# Patient Record
Sex: FEMALE | Race: BLACK OR AFRICAN AMERICAN | Employment: UNEMPLOYED | URBAN - METROPOLITAN AREA
[De-identification: names, ages, dates, MRNs, and addresses within clinical notes are randomized per-mention and may not be internally consistent; named-entity substitution may affect disease eponyms.]

---

## 2019-01-25 PROBLEM — E66.01 OBESITY, MORBID (HCC): Status: ACTIVE | Noted: 2019-01-25

## 2021-11-03 LAB — SARS-COV-2, NAA: NEGATIVE

## 2022-03-20 PROBLEM — E66.01 OBESITY, MORBID (HCC): Status: ACTIVE | Noted: 2019-01-25

## 2022-11-03 ENCOUNTER — OFFICE VISIT (OUTPATIENT)
Dept: FAMILY MEDICINE CLINIC | Age: 32
End: 2022-11-03

## 2022-11-03 VITALS
HEART RATE: 71 BPM | DIASTOLIC BLOOD PRESSURE: 82 MMHG | HEIGHT: 66 IN | WEIGHT: 293 LBS | TEMPERATURE: 98.1 F | RESPIRATION RATE: 16 BRPM | SYSTOLIC BLOOD PRESSURE: 136 MMHG | OXYGEN SATURATION: 98 % | BODY MASS INDEX: 47.09 KG/M2

## 2022-11-03 DIAGNOSIS — J45.909 UNCOMPLICATED ASTHMA, UNSPECIFIED ASTHMA SEVERITY, UNSPECIFIED WHETHER PERSISTENT: ICD-10-CM

## 2022-11-03 DIAGNOSIS — R03.0 PREHYPERTENSION: Primary | ICD-10-CM

## 2022-11-03 PROCEDURE — 99213 OFFICE O/P EST LOW 20 MIN: CPT | Performed by: NURSE PRACTITIONER

## 2022-11-03 RX ORDER — PHENOL/SODIUM PHENOLATE
20 AEROSOL, SPRAY (ML) MUCOUS MEMBRANE DAILY
COMMUNITY
End: 2022-11-03 | Stop reason: ALTCHOICE

## 2022-11-03 RX ORDER — ALBUTEROL SULFATE 0.83 MG/ML
2.5 SOLUTION RESPIRATORY (INHALATION)
Qty: 24 EACH | Refills: 1 | Status: SHIPPED | OUTPATIENT
Start: 2022-11-03

## 2022-11-03 RX ORDER — ALBUTEROL SULFATE 90 UG/1
2 AEROSOL, METERED RESPIRATORY (INHALATION)
Qty: 3 EACH | Refills: 1 | Status: SHIPPED | OUTPATIENT
Start: 2022-11-03

## 2022-11-03 RX ORDER — MONTELUKAST SODIUM 10 MG/1
10 TABLET ORAL DAILY
Qty: 90 TABLET | Refills: 1 | Status: SHIPPED | OUTPATIENT
Start: 2022-11-03

## 2022-11-03 NOTE — PROGRESS NOTES
Chief Complaint   Patient presents with    Medication Evaluation       1. \"Have you been to the ER, urgent care clinic since your last visit? Hospitalized since your last visit? \" No    2. \"Have you seen or consulted any other health care providers outside of the [address] [place] [address] since your last visit? \" No     3. For patients over 45: Has the patient had a colonoscopy? NA - based on age     If the patient is female:    4. For patients over 36: Has the patient had a mammogram? NA - based on age    11. For patients over 21: Has the patient had a pap smear?  Yes - no Care Gap present    3 most recent PHQ Screens 11/3/2022   Little interest or pleasure in doing things Not at all   Feeling down, depressed, irritable, or hopeless Not at all   Total Score PHQ 2 0     Health Maintenance Due   Topic Date Due    COVID-19 Vaccine (1) Never done    DTaP/Tdap/Td series (1 - Tdap) Never done    Flu Vaccine (1) Never done

## 2022-11-03 NOTE — PATIENT INSTRUCTIONS
Zyrtec, Singulair, Pepcid - all one a day  Albuterol as needed    See pulmonology for pulmonary function testing  [facility]    H Pylori and pap smear after January.

## 2022-11-03 NOTE — PROGRESS NOTES
Assessment/Plan:     Diagnoses and all orders for this visit:    1. Prehypertension  We have discussed lifestyle modifications to improve blood pressure. She will follow up with [facility] regarding ALEX evaluation. 2. Uncomplicated asthma, unspecified asthma severity, unspecified whether persistent  -     montelukast (Singulair) 10 mg tablet; Take 1 Tablet by mouth daily. -     albuterol (PROVENTIL HFA, VENTOLIN HFA, PROAIR HFA) 90 mcg/actuation inhaler; Take 2 Puffs by inhalation every four (4) hours as needed for Wheezing.  -     albuterol (PROVENTIL VENTOLIN) 2.5 mg /3 mL (0.083 %) nebu; 3 mL by Nebulization route every four (4) hours as needed for Wheezing. Treatment as above. Follow up if no improvement. Consider PFT if persistent. Follow-up and Dispositions    Return in about 3 months (around 2/3/2023) for Complete Physical.         Discussed expected course/resolution/complications of diagnosis in detail with patient. Medication risks/benefits/costs/interactions/alternatives discussed with patient. Pt was given after visit summary which includes diagnoses, current medications & vitals. Pt expressed understanding with the diagnosis and plan          Subjective:      Matheus Mills is a 34 y.o. female who presents for had concerns including Medication Evaluation. Here for follow up of asthma. History of asthma is longstanding. Symptoms are worsening over time. Has attempted albuterol more regularly with no improvement. This is her first evaluation. Not currently attempting otc treatment. Patient Active Problem List   Diagnosis Code    [facility] V33.80    Uncomplicated asthma H55.791    Prehypertension R03.0       Current Outpatient Medications   Medication Sig Dispense Refill    montelukast (Singulair) 10 mg tablet Take 1 Tablet by mouth daily.  90 Tablet 1    albuterol (PROVENTIL HFA, VENTOLIN HFA, PROAIR HFA) 90 mcg/actuation inhaler Take 2 Puffs by inhalation every four (4) hours as needed for Wheezing. 3 Each 1    albuterol (PROVENTIL VENTOLIN) 2.5 mg /3 mL (0.083 %) nebu 3 mL by Nebulization route every four (4) hours as needed for Wheezing. 24 Each 1    levonorgestrel (LILETTA IU) by IntraUTERine route. Nebulizer & Compressor machine Use as directed q4h prn 1 Each 0       Allergies   Allergen Reactions    Bactrim [Sulfamethoxazole-Trimethoprim] Itching       ROS:   Review of Systems   Constitutional:  Negative for malaise/fatigue. Eyes:  Negative for blurred vision. Respiratory:  Positive for wheezing. Negative for shortness of breath. Cardiovascular:  Negative for chest pain. Objective:   Visit Vitals  /82 (BP 1 Location: Left upper arm, BP Patient Position: Sitting, BP Cuff Size: Large adult long)   Pulse 71   Temp 98.1 °F (36.7 °C)   Resp 16   Ht 5' 6\" (1.676 m)   Wt (!) 353 lb (160.1 kg)   LMP  (LMP Unknown)   SpO2 98%   BMI 56.98 kg/m²       Vitals and Nurse Documentation reviewed. Physical Exam  Constitutional:       General: She is not in acute distress. Appearance: She is obese. Cardiovascular:      Heart sounds: S1 normal and S2 normal. No murmur heard. No friction rub. No gallop. Pulmonary:      Effort: No respiratory distress. Breath sounds: No wheezing. Skin:     General: Skin is warm and dry.    Psychiatric:         Mood and Affect: Mood and affect normal.

## 2022-11-16 PROBLEM — J45.909 UNCOMPLICATED ASTHMA: Status: ACTIVE | Noted: 2022-11-16

## 2022-11-16 PROBLEM — R03.0 PREHYPERTENSION: Status: ACTIVE | Noted: 2022-11-16

## 2025-05-09 DIAGNOSIS — Z34.90 PREGNANCY, UNSPECIFIED GESTATIONAL AGE: Primary | ICD-10-CM

## 2025-05-12 ENCOUNTER — ROUTINE PRENATAL (OUTPATIENT)
Age: 35
End: 2025-05-12

## 2025-05-12 VITALS — SYSTOLIC BLOOD PRESSURE: 126 MMHG | DIASTOLIC BLOOD PRESSURE: 74 MMHG | HEART RATE: 82 BPM

## 2025-05-12 DIAGNOSIS — Z34.90 PREGNANCY, UNSPECIFIED GESTATIONAL AGE: ICD-10-CM

## 2025-05-12 PROCEDURE — 99204 OFFICE O/P NEW MOD 45 MIN: CPT | Performed by: STUDENT IN AN ORGANIZED HEALTH CARE EDUCATION/TRAINING PROGRAM

## 2025-05-12 PROCEDURE — 76811 OB US DETAILED SNGL FETUS: CPT | Performed by: STUDENT IN AN ORGANIZED HEALTH CARE EDUCATION/TRAINING PROGRAM

## 2025-05-12 RX ORDER — ACETAMINOPHEN 160 MG
2000 TABLET,DISINTEGRATING ORAL DAILY
COMMUNITY

## 2025-05-12 RX ORDER — ASPIRIN 81 MG/1
81 TABLET ORAL DAILY
COMMUNITY

## 2025-05-12 NOTE — PROGRESS NOTES
Patient was seen 5/12/2025      Please look under media to view full consult and ultrasound report in [employer].         Sharon Galeas MD   Maternal Fetal Medicine

## 2025-05-12 NOTE — PROCEDURES
PATIENT: MINERVA VERDE   -  : 1990   -  DOS:2025   -  INTERPRETING PROVIDER:Sharon Galeas,   Indication  ========    Obesity in pregnancy, BMI 57, Advanced Maternal Age    Method  ======    Transabdominal and transvaginal ultrasound examination. View: suboptimal due to maternal acoustic properties. suboptimal due to unfavorable fetal position    Dating  ======    Ultrasound examination on: 2025  GA by U/S based upon: AC, BPD, Femur, HC  GA by U/S 20 w + 3 d  MIKEY by U/S: 2025  Assigned: based on ultrasound (AC, BPD, Femur, HC), selected on 2025  Assigned GA 20 w + 3 d  Assigned MIKEY: 2025    Fetal Growth Overview  =================    Exam date        GA              BPD (mm)          HC (mm)              AC (mm)            FL (mm)             HL (mm)             EFW (g)  2025        20w 3d        45.6     24%        178.8    37%        157     58%        33.6    45%        32.7     72%        367    56%    Fetal Biometry  ============    Standard  BPD 45.6 mm 19w 5d 24% Hadlock  OFD 63.7 mm 21w 5d 88% Ghulam  .8 mm 20w 2d 37% Hadlock  Cerebellum tr 19.5 mm 18w 6d 18% Hill  Nuchal fold 4.0 mm  .0 mm 20w 6d 58% Hadlock  Femur 33.6 mm 20w 4d 45% Hadlock  Humerus 32.7 mm 21w 0d 72% Ghulam   g 20w 4d 56% Hadlock  EFW (lb) 0 lb  EFW (oz) 13 oz  EFW by: Hadlock (BPD-HC-AC-FL)  Extended   6.0 mm  CM 4.5 mm  32% Nicolaides  Head / Face / Neck  Nasal bone: NOT VISUALIZED  Other Structures   bpm    General Evaluation  ==============    Cardiac activity present.  bpm. Fetal movements: visualized. Presentation: Transverse, head to maternal right  Placenta: Placental site: posterior, LOW LYING. Placental edge-to-cervical os distance 1.3 cm  Umbilical cord: Cord vessels: 3 vessel cord. Insertion site: central  Amniotic fluid: Amount of AF: normal    Fetal Anatomy  ===========    Cranium: normal  Lateral ventricles: normal  Choroid plexus: normal  Midline

## 2025-06-18 DIAGNOSIS — Z34.90 PREGNANCY, UNSPECIFIED GESTATIONAL AGE: Primary | ICD-10-CM
